# Patient Record
Sex: MALE | Race: WHITE | NOT HISPANIC OR LATINO
[De-identification: names, ages, dates, MRNs, and addresses within clinical notes are randomized per-mention and may not be internally consistent; named-entity substitution may affect disease eponyms.]

---

## 2019-03-04 ENCOUNTER — APPOINTMENT (OUTPATIENT)
Dept: CARDIOLOGY | Facility: CLINIC | Age: 24
End: 2019-03-04
Payer: COMMERCIAL

## 2019-03-04 ENCOUNTER — NON-APPOINTMENT (OUTPATIENT)
Age: 24
End: 2019-03-04

## 2019-03-04 ENCOUNTER — APPOINTMENT (OUTPATIENT)
Dept: CARDIOLOGY | Facility: CLINIC | Age: 24
End: 2019-03-04

## 2019-03-04 ENCOUNTER — TRANSCRIPTION ENCOUNTER (OUTPATIENT)
Age: 24
End: 2019-03-04

## 2019-03-04 VITALS
HEART RATE: 75 BPM | WEIGHT: 184 LBS | OXYGEN SATURATION: 98 % | HEIGHT: 69 IN | SYSTOLIC BLOOD PRESSURE: 118 MMHG | BODY MASS INDEX: 27.25 KG/M2 | DIASTOLIC BLOOD PRESSURE: 70 MMHG

## 2019-03-04 DIAGNOSIS — D22.9 MELANOCYTIC NEVI, UNSPECIFIED: ICD-10-CM

## 2019-03-04 DIAGNOSIS — Z00.00 ENCOUNTER FOR GENERAL ADULT MEDICAL EXAMINATION W/OUT ABNORMAL FINDINGS: ICD-10-CM

## 2019-03-04 DIAGNOSIS — Z11.3 ENCOUNTER FOR SCREENING FOR INFECTIONS WITH A PREDOMINANTLY SEXUAL MODE OF TRANSMISSION: ICD-10-CM

## 2019-03-04 DIAGNOSIS — I45.10 UNSPECIFIED RIGHT BUNDLE-BRANCH BLOCK: ICD-10-CM

## 2019-03-04 LAB
ALBUMIN SERPL ELPH-MCNC: 4.9 G/DL
ALP BLD-CCNC: 56 U/L
ALT SERPL-CCNC: 18 U/L
ANION GAP SERPL CALC-SCNC: 13 MMOL/L
AST SERPL-CCNC: 24 U/L
BASOPHILS # BLD AUTO: 0.03 K/UL
BASOPHILS NFR BLD AUTO: 0.6 %
BILIRUB SERPL-MCNC: 0.4 MG/DL
BUN SERPL-MCNC: 17 MG/DL
CALCIUM SERPL-MCNC: 9.7 MG/DL
CHLORIDE SERPL-SCNC: 102 MMOL/L
CHOLEST SERPL-MCNC: 192 MG/DL
CHOLEST/HDLC SERPL: 3.5 RATIO
CO2 SERPL-SCNC: 27 MMOL/L
CREAT SERPL-MCNC: 1.16 MG/DL
CRP SERPL HS-MCNC: 0.17 MG/L
EOSINOPHIL # BLD AUTO: 0.04 K/UL
EOSINOPHIL NFR BLD AUTO: 0.8 %
ESTRADIOL SERPL-MCNC: 23 PG/ML
GLUCOSE SERPL-MCNC: 89 MG/DL
HBA1C MFR BLD HPLC: 5.2 %
HCT VFR BLD CALC: 48.9 %
HCYS SERPL-MCNC: 5.9 UMOL/L
HDLC SERPL-MCNC: 55 MG/DL
HGB BLD-MCNC: 16 G/DL
IMM GRANULOCYTES NFR BLD AUTO: 0.4 %
LDLC SERPL CALC-MCNC: 127 MG/DL
LYMPHOCYTES # BLD AUTO: 1.45 K/UL
LYMPHOCYTES NFR BLD AUTO: 27.3 %
MAN DIFF?: NORMAL
MCHC RBC-ENTMCNC: 29.6 PG
MCHC RBC-ENTMCNC: 32.7 GM/DL
MCV RBC AUTO: 90.6 FL
MONOCYTES # BLD AUTO: 0.75 K/UL
MONOCYTES NFR BLD AUTO: 14.1 %
NEUTROPHILS # BLD AUTO: 3.03 K/UL
NEUTROPHILS NFR BLD AUTO: 56.8 %
PLATELET # BLD AUTO: 231 K/UL
POTASSIUM SERPL-SCNC: 4.4 MMOL/L
PROLACTIN SERPL-MCNC: 8.9 NG/ML
PROT SERPL-MCNC: 7.5 G/DL
PSA FREE FLD-MCNC: 48 %
PSA FREE SERPL-MCNC: 0.23 NG/ML
PSA SERPL-MCNC: 0.49 NG/ML
RBC # BLD: 5.4 M/UL
RBC # FLD: 12 %
SODIUM SERPL-SCNC: 142 MMOL/L
TRIGL SERPL-MCNC: 48 MG/DL
TSH SERPL-ACNC: 1.3 UIU/ML
WBC # FLD AUTO: 5.32 K/UL

## 2019-03-04 PROCEDURE — 93224 XTRNL ECG REC UP TO 48 HRS: CPT

## 2019-03-04 PROCEDURE — 99385 PREV VISIT NEW AGE 18-39: CPT

## 2019-03-04 PROCEDURE — 93306 TTE W/DOPPLER COMPLETE: CPT

## 2019-03-04 PROCEDURE — 93000 ELECTROCARDIOGRAM COMPLETE: CPT | Mod: 59

## 2019-03-04 RX ORDER — FINASTERIDE 1 MG/1
1 TABLET ORAL
Refills: 0 | Status: ACTIVE | COMMUNITY

## 2019-03-04 NOTE — ASSESSMENT
[FreeTextEntry1] : Pt here for routine physical as well as cardiac clearance prior to initiation of ADD medication. Pt with history of anxiety and remote history of palpitations.

## 2019-03-04 NOTE — HISTORY OF PRESENT ILLNESS
[FreeTextEntry1] : 23 yr old male presents for follow up for history of palpitations and clearance for ADD medication. Also states he is overdue for a physical exam. Denies any complaints at this time. denies chest pain sob /n/v /diaphoresis .pt with hx of palpitations

## 2019-03-04 NOTE — PHYSICAL EXAM
[General Appearance - Well Developed] : well developed [Normal Appearance] : normal appearance [Well Groomed] : well groomed [General Appearance - Well Nourished] : well nourished [No Deformities] : no deformities [General Appearance - In No Acute Distress] : no acute distress [FreeTextEntry1] : few nevi noted

## 2019-03-04 NOTE — DISCUSSION/SUMMARY
[Anxiety] : anxiety disorder NOS [Stable] : stable [None] : There are no changes in medication management [Patient] : the patient [FreeTextEntry1] : Pt seen and examined for palpitation follow up and physical exam. Lab work and echo ordered for ADD medication clearance. Will obtain holter as well. Counseled patient regarding STD education; patient is in monogamous relationship and practices safe sex. Multiple nevi noted-patient encouraged to see dermatologist for screening.

## 2019-03-05 LAB
25(OH)D3 SERPL-MCNC: 23.6 NG/ML
THYROGLOB AB SERPL-ACNC: <20 IU/ML
THYROPEROXIDASE AB SERPL IA-ACNC: <10 IU/ML

## 2019-03-06 ENCOUNTER — NON-APPOINTMENT (OUTPATIENT)
Age: 24
End: 2019-03-06

## 2019-03-08 LAB
DHEA SERPL-MCNC: 375 NG/DL
TESTOST BND SERPL-MCNC: 11.6 PG/ML
TESTOST SERPL-MCNC: 638.2 NG/DL

## 2019-03-11 LAB — T4 FREE SERPL DIALY-MCNC: 1.4 NG/DL

## 2020-05-22 ENCOUNTER — APPOINTMENT (OUTPATIENT)
Dept: PEDIATRIC UROLOGY | Facility: CLINIC | Age: 25
End: 2020-05-22
Payer: COMMERCIAL

## 2020-05-22 VITALS — WEIGHT: 185 LBS | HEIGHT: 69 IN | TEMPERATURE: 98.5 F | BODY MASS INDEX: 27.4 KG/M2

## 2020-05-22 DIAGNOSIS — N50.89 OTHER SPECIFIED DISORDERS OF THE MALE GENITAL ORGANS: ICD-10-CM

## 2020-05-22 PROCEDURE — 99214 OFFICE O/P EST MOD 30 MIN: CPT | Mod: 25

## 2020-05-22 PROCEDURE — 93976 VASCULAR STUDY: CPT

## 2020-05-22 PROCEDURE — 76870 US EXAM SCROTUM: CPT

## 2020-05-22 NOTE — ASSESSMENT
[FreeTextEntry1] : REA  has an epididymal cyst. These are very common findings fortunately benign. I had a discussion with him regarding the nature of epididymal cysts and their benign course. They can grow to be large sizes and occasionally causes discomfort. Surgery is typically not indicated except for those 2 relative indications. \par \par He also has a moderate varicocele.  I discussed the possible implications on fertility and that getting a semen analysis for informative purposes could be ordered.  He was not interested in that at this time.\par All questions were answered.    He will return as needed

## 2020-05-22 NOTE — REASON FOR VISIT
[Initial Consultation] : an initial consultation [Patient] : patient [TextBox_50] : right lump on testis [TextBox_8] : Dr. Gianfranco Kent

## 2020-05-22 NOTE — DATA REVIEWED
[FreeTextEntry1] : EXAMINATION:  US SCROTUM\par DOS: TODAY\par FINDINGS: SMALL RIGHT EPIDIDYMAL CYST AND A LEFT VARICOCELE WITH SYMMETRIC SIZED TESTES.  OTHERWISE UNREMARKABLE SCROTAL CONTENTS; NORMAL TESTES WITH NORMAL FLOW

## 2020-05-22 NOTE — HISTORY OF PRESENT ILLNESS
[TextBox_4] : Gianfranco is her for consultation.  He reports that he recently palpated an abnormality in the right testis.  It was during self-examination and was not related to any injury, infection, inflammation.  He has not had any pain or swelling and the size of the area has not changed.  No issues on the other side except a known asymtomatic variocele.

## 2020-05-22 NOTE — PHYSICAL EXAM
[Well nourished] : well nourished [Well developed] : well developed [Well appearing] : well appearing [Deferred] : deferred [Dysmorphic] : no dysmorphic [Acute distress] : no acute distress [Abnormal shape] : no abnormal shape [Ear anomaly] : no ear anomaly [Nasal discharge] : no nasal discharge [Abnormal nose shape] : no abnormal nose shape [Mouth lesions] : no mouth lesions [Eye discharge] : no eye discharge [Icteric sclera] : no icteric sclera [Labored breathing] : non- labored breathing [Rigid] : not rigid [Hepatomegaly] : no hepatomegaly [Mass] : no mass [Splenomegaly] : no splenomegaly [RUQ Tenderness] : no ruq tenderness [Palpable bladder] : no palpable bladder [LUQ Tenderness] : no luq tenderness [RLQ Tenderness] : no rlq tenderness [LLQ Tenderness] : no llq tenderness [Right tenderness] : no right tenderness [Left tenderness] : no left tenderness [Renomegaly] : no renomegaly [Right-side mass] : no right-side mass [Left-side mass] : no left-side mass [Dimple] : no dimple [Limited limb movement] : no limited limb movement [Hair Tuft] : no hair tuft [Edema] : no edema [Rashes] : no rashes [Ulcers] : no ulcers [Abnormal turgor] : normal turgor [Circumcised] : circumcised [At tip of glans] : meatus at tip of glans [Scrotal] : right testicle - scrotal [Vertical] : left testicle - vertical [Palpable] : palpable [Symmetric] : symmetric [Induration] : no induration [Small] : small right epididyal head mass [Tenderness] : no tenderness [Grade 2] : left - grade 2 [No] : left - not palpable

## 2020-05-22 NOTE — CONSULT LETTER
[Dear  ___] : Dear  [unfilled], [FreeTextEntry1] : Please see my note below.\par \par Thank you so very much for allowing to participate in REA's care.  Please don't hesitate to call me should any questions or issues arise.\par \par Sincerely, \par \par Melchor\par \par Melchor Comer MD\par Chief, Pediatric Urology\par Professor of Urology and Pediatrics\par [facility]  [Consult Letter:] : I had the pleasure of evaluating your patient, [unfilled].

## 2020-07-28 ENCOUNTER — APPOINTMENT (OUTPATIENT)
Dept: CARDIOLOGY | Facility: CLINIC | Age: 25
End: 2020-07-28
Payer: COMMERCIAL

## 2020-07-28 ENCOUNTER — NON-APPOINTMENT (OUTPATIENT)
Age: 25
End: 2020-07-28

## 2020-07-28 ENCOUNTER — LABORATORY RESULT (OUTPATIENT)
Age: 25
End: 2020-07-28

## 2020-07-28 VITALS
HEART RATE: 65 BPM | WEIGHT: 182 LBS | DIASTOLIC BLOOD PRESSURE: 60 MMHG | BODY MASS INDEX: 26.96 KG/M2 | OXYGEN SATURATION: 97 % | HEIGHT: 69 IN | TEMPERATURE: 98.9 F | SYSTOLIC BLOOD PRESSURE: 126 MMHG

## 2020-07-28 DIAGNOSIS — R06.02 SHORTNESS OF BREATH: ICD-10-CM

## 2020-07-28 DIAGNOSIS — Z12.5 ENCOUNTER FOR SCREENING FOR MALIGNANT NEOPLASM OF PROSTATE: ICD-10-CM

## 2020-07-28 DIAGNOSIS — Z13.228 ENCOUNTER FOR SCREENING FOR OTHER METABOLIC DISORDERS: ICD-10-CM

## 2020-07-28 DIAGNOSIS — R00.2 PALPITATIONS: ICD-10-CM

## 2020-07-28 DIAGNOSIS — Z00.00 ENCOUNTER FOR GENERAL ADULT MEDICAL EXAMINATION W/OUT ABNORMAL FINDINGS: ICD-10-CM

## 2020-07-28 DIAGNOSIS — F41.9 ANXIETY DISORDER, UNSPECIFIED: ICD-10-CM

## 2020-07-28 PROCEDURE — 99395 PREV VISIT EST AGE 18-39: CPT

## 2020-07-28 PROCEDURE — 93000 ELECTROCARDIOGRAM COMPLETE: CPT

## 2020-07-28 RX ORDER — BUPROPION HYDROCHLORIDE 300 MG/1
300 TABLET, EXTENDED RELEASE ORAL
Refills: 0 | Status: DISCONTINUED | COMMUNITY
End: 2020-07-28

## 2020-07-28 NOTE — PHYSICAL EXAM
[General Appearance - Well Developed] : well developed [Normal Appearance] : normal appearance [Well Groomed] : well groomed [General Appearance - Well Nourished] : well nourished [No Deformities] : no deformities [General Appearance - In No Acute Distress] : no acute distress [Normal Conjunctiva] : the conjunctiva exhibited no abnormalities [Eyelids - No Xanthelasma] : the eyelids demonstrated no xanthelasmas [Normal Oral Mucosa] : normal oral mucosa [No Oral Pallor] : no oral pallor [No Oral Cyanosis] : no oral cyanosis [Normal Jugular Venous A Waves Present] : normal jugular venous A waves present [Normal Jugular Venous V Waves Present] : normal jugular venous V waves present [No Jugular Venous Styles A Waves] : no jugular venous styles A waves [Respiration, Rhythm And Depth] : normal respiratory rhythm and effort [Auscultation Breath Sounds / Voice Sounds] : lungs were clear to auscultation bilaterally [Exaggerated Use Of Accessory Muscles For Inspiration] : no accessory muscle use [Abdomen Soft] : soft [Abdomen Tenderness] : non-tender [Abdomen Mass (___ Cm)] : no abdominal mass palpated [FreeTextEntry1] : normal genitals  [Abnormal Walk] : normal gait [Gait - Sufficient For Exercise Testing] : the gait was sufficient for exercise testing [Cyanosis, Localized] : no localized cyanosis [Nail Clubbing] : no clubbing of the fingernails [Petechial Hemorrhages (___cm)] : no petechial hemorrhages [Skin Color & Pigmentation] : normal skin color and pigmentation [] : no rash [No Venous Stasis] : no venous stasis [Skin Lesions] : no skin lesions [No Skin Ulcers] : no skin ulcer [No Xanthoma] : no  xanthoma was observed [Oriented To Time, Place, And Person] : oriented to person, place, and time [Affect] : the affect was normal [Mood] : the mood was normal [No Anxiety] : not feeling anxious

## 2020-07-28 NOTE — HISTORY OF PRESENT ILLNESS
[FreeTextEntry1] : pt presents for yearly physical .pt with episodes of palpitations  associated with stress .pt doing well otherwise seeing psychiatry  and being monitored was given adderal pt denies any chest  pain dizziness ,lightheadedness ,nausea vomiting diaphoresis\par pt hardly takes adderal .pt also wwith episodes of sob when sleeping concerned about sleep aopnea no hx of snoring

## 2020-07-30 ENCOUNTER — APPOINTMENT (OUTPATIENT)
Dept: CARDIOLOGY | Facility: CLINIC | Age: 25
End: 2020-07-30
Payer: COMMERCIAL

## 2020-07-30 PROCEDURE — 93015 CV STRESS TEST SUPVJ I&R: CPT

## 2020-07-30 PROCEDURE — 93306 TTE W/DOPPLER COMPLETE: CPT

## 2020-08-10 ENCOUNTER — NON-APPOINTMENT (OUTPATIENT)
Age: 25
End: 2020-08-10

## 2020-08-11 ENCOUNTER — APPOINTMENT (OUTPATIENT)
Dept: CARDIOLOGY | Facility: CLINIC | Age: 25
End: 2020-08-11
Payer: COMMERCIAL

## 2020-08-11 PROCEDURE — 93224 XTRNL ECG REC UP TO 48 HRS: CPT

## 2020-08-23 LAB
25(OH)D3 SERPL-MCNC: 33 NG/ML
ALBUMIN SERPL ELPH-MCNC: 4.8 G/DL
ALP BLD-CCNC: 49 U/L
ALT SERPL-CCNC: 9 U/L
ANION GAP SERPL CALC-SCNC: 13 MMOL/L
APPEARANCE: CLEAR
AST SERPL-CCNC: 19 U/L
BACTERIA: NEGATIVE
BASOPHILS # BLD AUTO: 0.03 K/UL
BASOPHILS NFR BLD AUTO: 0.4 %
BILIRUB SERPL-MCNC: 0.4 MG/DL
BILIRUBIN URINE: NEGATIVE
BLOOD URINE: NEGATIVE
BUN SERPL-MCNC: 17 MG/DL
CALCIUM SERPL-MCNC: 9.5 MG/DL
CHLORIDE SERPL-SCNC: 103 MMOL/L
CHOLEST SERPL-MCNC: 199 MG/DL
CHOLEST/HDLC SERPL: 3.8 RATIO
CO2 SERPL-SCNC: 24 MMOL/L
COLOR: COLORLESS
CREAT SERPL-MCNC: 1.1 MG/DL
EOSINOPHIL # BLD AUTO: 0.04 K/UL
EOSINOPHIL NFR BLD AUTO: 0.5 %
ESTIMATED AVERAGE GLUCOSE: 97 MG/DL
GLUCOSE QUALITATIVE U: NEGATIVE
GLUCOSE SERPL-MCNC: 85 MG/DL
HBA1C MFR BLD HPLC: 5 %
HCT VFR BLD CALC: 46.6 %
HDLC SERPL-MCNC: 53 MG/DL
HGB BLD-MCNC: 14.9 G/DL
HYALINE CASTS: 0 /LPF
IMM GRANULOCYTES NFR BLD AUTO: 0.3 %
KETONES URINE: NEGATIVE
LDLC SERPL CALC-MCNC: 133 MG/DL
LEUKOCYTE ESTERASE URINE: NEGATIVE
LYMPHOCYTES # BLD AUTO: 2.14 K/UL
LYMPHOCYTES NFR BLD AUTO: 28.5 %
MAGNESIUM SERPL-MCNC: 1.9 MG/DL
MAN DIFF?: NORMAL
MCHC RBC-ENTMCNC: 29.6 PG
MCHC RBC-ENTMCNC: 32 GM/DL
MCV RBC AUTO: 92.5 FL
MICROSCOPIC-UA: NORMAL
MONOCYTES # BLD AUTO: 0.86 K/UL
MONOCYTES NFR BLD AUTO: 11.4 %
NEUTROPHILS # BLD AUTO: 4.43 K/UL
NEUTROPHILS NFR BLD AUTO: 58.9 %
NITRITE URINE: NEGATIVE
PH URINE: 6.5
PHOSPHATE SERPL-MCNC: 3.2 MG/DL
PLATELET # BLD AUTO: 198 K/UL
POTASSIUM SERPL-SCNC: 4.3 MMOL/L
PROT SERPL-MCNC: 7 G/DL
PROTEIN URINE: NEGATIVE
PSA SERPL-MCNC: 0.69 NG/ML
RBC # BLD: 5.04 M/UL
RBC # FLD: 13.2 %
RED BLOOD CELLS URINE: 0 /HPF
SODIUM SERPL-SCNC: 140 MMOL/L
SPECIFIC GRAVITY URINE: 1.01
SQUAMOUS EPITHELIAL CELLS: 0 /HPF
T3RU NFR SERPL: 0.9 TBI
T4 FREE SERPL-MCNC: 1.3 NG/DL
T4 SERPL-MCNC: 7.4 UG/DL
TRIGL SERPL-MCNC: 65 MG/DL
TSH SERPL-ACNC: 1.13 UIU/ML
URATE SERPL-MCNC: 4.5 MG/DL
UROBILINOGEN URINE: NORMAL
WBC # FLD AUTO: 7.52 K/UL
WHITE BLOOD CELLS URINE: 0 /HPF

## 2021-01-30 ENCOUNTER — EMERGENCY (EMERGENCY)
Facility: HOSPITAL | Age: 26
LOS: 1 days | Discharge: HOME | End: 2021-01-30
Admitting: EMERGENCY MEDICINE
Payer: COMMERCIAL

## 2021-01-30 VITALS
OXYGEN SATURATION: 100 % | SYSTOLIC BLOOD PRESSURE: 114 MMHG | HEART RATE: 66 BPM | RESPIRATION RATE: 18 BRPM | DIASTOLIC BLOOD PRESSURE: 62 MMHG | TEMPERATURE: 98 F

## 2021-01-30 PROCEDURE — 99283 EMERGENCY DEPT VISIT LOW MDM: CPT | Mod: 25

## 2021-01-30 PROCEDURE — 12001 RPR S/N/AX/GEN/TRNK 2.5CM/<: CPT

## 2021-01-30 NOTE — ED PROVIDER NOTE - CLINICAL SUMMARY MEDICAL DECISION MAKING FREE TEXT BOX
25yM w/no significant pmhx presenting with laceration to right 2nd digit. Pt is well appearing, 1.5cm laceration to right pointer finger on dorsal aspect distal to PIP does not cross joint space, ROM intact, sensation intact. Tetanus up to date. Will suture repair. 25yM w/no significant pmhx presenting with laceration to right 2nd digit. Pt is well appearing, 1.5cm laceration to right index finger on dorsal aspect distal to PIP does not cross joint space, ROM intact, sensation intact. Tetanus up to date. Will suture repair.

## 2021-01-30 NOTE — ED PROVIDER NOTE - PHYSICAL EXAMINATION
FROM of right 2nd digit  sensation intact throughout Skin: 1.5cm laceration to right 2nd digit, dorsal aspect, distal to PIP, does not cross PIP or DIP, no active bleeding, linear and superficial  FROM right hand including 2nd digit, sensation intact throughout affected finger

## 2021-01-30 NOTE — ED PROVIDER NOTE - PATIENT PORTAL LINK FT
You can access the FollowMyHealth Patient Portal offered by [facility] by registering at the following website: [email or web address]. By joining [facility]’s FollowMyHealth portal, you will also be able to view your health information using other applications (apps) compatible with our system.

## 2021-01-30 NOTE — ED PROVIDER NOTE - OBJECTIVE STATEMENT
25yM w/no significant pmhx presenting with laceration to right 2nd digit. Pt states he was using a knife as a screwdriver ans it slipped, cutting his right second digit. Last tetanus shot was 1.5 years ago. Pt denies numbness, decreased ROM of affected finger 25yM w/no significant pmhx presenting with laceration to right 2nd digit. Pt states he was using a knife as a screwdriver (clean and new) and it slipped, cutting his right second digit. Last tetanus shot was 1.5 years ago. No active bleeding. Pt denies numbness, tingling, decreased ROM of affected finger, weakness, fever/chills or any other concerns.

## 2021-01-30 NOTE — ED ADULT TRIAGE NOTE - CHIEF COMPLAINT QUOTE
PT C/O laceration to right hand second digit. Pt states was using a knife, slipped and cut self. Last tetanus shot 1.5 years ago. Clean dry bandage applied.

## 2021-01-30 NOTE — ED PROVIDER NOTE - NSFOLLOWUPINSTRUCTIONS_ED_ALL_ED_FT
Follow up with your primary care doctor in 7 days OR return to the ER in 7 days to have sutures removed  Apply bacitracin to area daily and cover with bandage  Keep area dry for 24 hours then you can wash gently with warm soapy water  Return to the ER with any worsening or concerning symptoms, pain or swelling to area, pus drainage, fever or any other concerns.

## 2021-01-30 NOTE — ED PROVIDER NOTE - PROGRESS NOTE DETAILS
TIRSO Wick: ___ suture repain 7 sutures TIRSO Wick: Placed 7 sutures to repair laceration, no complications. Discussed with pt he is to return in 7 days to have the sutures removed. Keep area dry for 24 hours then he can wash gently with warm soapy water,

## 2023-06-07 PROBLEM — Z78.9 OTHER SPECIFIED HEALTH STATUS: Chronic | Status: ACTIVE | Noted: 2021-01-31

## 2023-06-14 ENCOUNTER — NON-APPOINTMENT (OUTPATIENT)
Age: 28
End: 2023-06-14

## 2023-06-15 ENCOUNTER — NON-APPOINTMENT (OUTPATIENT)
Age: 28
End: 2023-06-15

## 2023-06-15 ENCOUNTER — APPOINTMENT (OUTPATIENT)
Dept: ORTHOPEDIC SURGERY | Facility: CLINIC | Age: 28
End: 2023-06-15
Payer: COMMERCIAL

## 2023-06-15 VITALS
WEIGHT: 175 LBS | HEART RATE: 68 BPM | HEIGHT: 69 IN | BODY MASS INDEX: 25.92 KG/M2 | SYSTOLIC BLOOD PRESSURE: 133 MMHG | DIASTOLIC BLOOD PRESSURE: 80 MMHG

## 2023-06-15 DIAGNOSIS — S76.312D STRAIN OF MUSCLE, FASCIA AND TENDON OF THE POSTERIOR MUSCLE GROUP AT THIGH LEVEL, LEFT THIGH, SUBSEQUENT ENCOUNTER: ICD-10-CM

## 2023-06-15 PROCEDURE — 99203 OFFICE O/P NEW LOW 30 MIN: CPT

## 2023-06-15 PROCEDURE — 73552 X-RAY EXAM OF FEMUR 2/>: CPT | Mod: LT

## 2023-06-30 PROBLEM — S76.312D STRAIN OF LEFT HAMSTRING, SUBSEQUENT ENCOUNTER: Status: ACTIVE | Noted: 2023-06-30

## 2023-06-30 NOTE — ADDENDUM
[FreeTextEntry1] : This note was written by Corie Davidson on 06/15/2023 acting solely as a scribe for Dr. New Maguire.\par \par All medical record entries made by the Scribe were at my, Dr. New Maguire, direction and personally dictated by me on 06/15/2023. I have personally reviewed the chart and agree that the record accurately reflects my personal performance of the history, physical exam, assessment and plan.

## 2023-06-30 NOTE — PHYSICAL EXAM
[de-identified] : Oriented to time, place, person\par Mood: Normal\par Affect: Normal\par Appearance: Healthy, well appearing, no acute distress.\par Gait: Normal\par Assistive Devices: None\par \par Left Lower Extremity:\par \par Skin: Clean, dry, intact\par Inspection: No obvious malalignment, no masses, no swelling.\par Pulses: 2+ DP/PT pulses\par ROM: Full knee ROM, full ankle ROM\par Tenderness: Discomfort at the myotendinous junction medial thigh. \par Stability: Ankle A/P stable, Knee stable varus/valgus/drawer\par Strength: 5/5 Q/TA/GS/EHL, 4/5 H without atrophy\par Neuro: In tact to light touch throughout, DTR's normal [de-identified] : The following radiographs were ordered and read by me during this patients visit. I reviewed each radiograph in detail with the patient and discussed the findings as highlighted below. \par \par 3 views of the left femur were obtained today, 06/15/2023, that show no acute fracture or dislocation. There is no degenerative change seen. There is no gross malalignment. No significant other obvious osseous abnormality, otherwise unremarkable.

## 2023-06-30 NOTE — HISTORY OF PRESENT ILLNESS
[de-identified] : 28 year old male presents today with left hamstring injury x 5 weeks. He felt a pop running to first base in softball. He noticed bruising a few days later. Reports pain with sprinting and sudden movements. Takes Ibuprofen as needed. C/o weakness. S/p left tibia ORIF 10 years ago. \par \par The patient's past medical history, past surgical history, medications and allergies were reviewed by me today with the patient and documented accordingly. In addition, the patient's family and social history, which were noncontributory to this visit, were reviewed also.

## 2023-06-30 NOTE — DISCUSSION/SUMMARY
[de-identified] : 27 y/o male with left hamstring strain.\par \par The patient presents with an acute injury to the left hamstring. This is consistent with a musculotendinous strain. I discussed that the mechanism typically results from hip flexion and knee extension. On clinical examination the patient does not have posterior ecchymosis or a palpable mass/defect within the thigh, which is consistent with a mild myotendinous injury.\par \par The recommendation for acute hamstring injuries is for flexibility/strengthening rehabilitation with physical therapy guidance. Operating indications are only for proximal avulsion ruptures. The biggest concern for this injury is for recurrence following inadequate rehabilitation. Return to play as typically recommended when strength is 90% of the contralateral side.\par \par Recommendation; rest from impact activities, applying ice packs twice daily, wrapping the injured area with a compression bandage, NSAIDS versus Tylenol to reduce pain and inflammation. Physical therapy as prescribed.\par \par Followup in 4-6 weeks for return to play guidance and re-evaluation.

## 2024-04-02 ENCOUNTER — APPOINTMENT (OUTPATIENT)
Dept: ORTHOPEDIC SURGERY | Facility: CLINIC | Age: 29
End: 2024-04-02
Payer: COMMERCIAL

## 2024-04-02 VITALS — WEIGHT: 165 LBS | HEIGHT: 69 IN | BODY MASS INDEX: 24.44 KG/M2

## 2024-04-02 DIAGNOSIS — M25.551 PAIN IN RIGHT HIP: ICD-10-CM

## 2024-04-02 PROCEDURE — 73502 X-RAY EXAM HIP UNI 2-3 VIEWS: CPT | Mod: RT

## 2024-04-02 PROCEDURE — 99213 OFFICE O/P EST LOW 20 MIN: CPT

## 2024-04-09 PROBLEM — M25.551 HIP PAIN, RIGHT: Status: ACTIVE | Noted: 2024-04-09

## 2024-04-09 NOTE — HISTORY OF PRESENT ILLNESS
[de-identified] : 28 year old male presents today with right groin pain since November 2023. Patient was playing baseball and felt a twinge while running the bases. The pain is constant worse with sneezing and running. Applies tiger bomb without relief. Aleve is helpful. Patient is very active and unable to do activities due to pain. Patient has sedentary job.

## 2024-04-09 NOTE — DISCUSSION/SUMMARY
[de-identified] : 29 y/o male with right groin pain.   Patient presents with an overuse injury with anterior pelvic and groin pain consistent with "sports hernia/athletic pubalgia". I discussed the pathophysiology of this condition that is typically caused by acute versus microtrauma to the insertion of the hip adductors, rectus, transversalis fascia within the groin. Pain theorized to be caused by emphasis on lower extremity strengthening with minimal emphasis on core leading to balance.  Diagnosis is typically clinical, however MRI imaging can be obtained following failure of conservative treatment. Early treatment is recommended with physical therapy with surgical treat typically reserved for cases refractory to nonoperative management. Surgical treatment is indicated for the specific site/cause of the dysfunction; this may include hernia repair vs. adductor release vs. nerve decompression.  Recommendations: MRI right hip rule out athletic pubalgia. Conservative care & observation, this includes rest/activity avoidance. Patient may also use OTC NSAID's or acetaminophen as tolerated, with application of ice to the area 2-3x daily for 20 minutes after periods of activity.   Follow-up after MRI.

## 2024-04-09 NOTE — ADDENDUM
[FreeTextEntry1] : This note was written by Corie Davidson on 04/02/2024 acting solely as a scribe for Dr. New Maguire.  All medical record entries made by the Scribe were at my, Dr. New Maguire, direction and personally dictated by me on 04/02/2024. I have personally reviewed the chart and agree that the record accurately reflects my personal performance of the history, physical exam, assessment and plan.

## 2024-04-09 NOTE — PHYSICAL EXAM
[de-identified] : Right Hip Exam:   Skin: Clean, dry, intact Inspection: No obvious deformity, no swelling. Pulses: 2+ DP/PT pulses ROM: 120 flexion. Internal rotation to 30. External rotation to 45. Painful ROM: Positive groin pain with activation of core. Tenderness: No tenderness over greater trochanter. No tenderness at gluteal insertion. No paraspinal tenderness. No axial lumbar tenderness. No sacroiliac tenderness. No TTP over the ASIS/Iliac crest.  Tender over the abductor.  Tender over the inguinal canal. Strength: 4+/5 hip flexion, 5/5 gluteal strength, 5/5 hip ADD, 5/5 hip ABD, 5/5 Q/H, 5/5 TA/GS/EHL Neuro: Sensation intact to light touch throughout, DTR normal Additional tests: Negative impingement test, Negative MARIANA Other findings: None.  [de-identified] : The following radiographs were ordered and read by me during this patients visit. I reviewed each radiograph in detail with the patient and discussed the findings as highlighted below.   AP pelvis and lateral view of the right hip were obtained today, 04/02/2024, that show no acute bony injury, including fracture or dislocation. There is no hip degenerative change seen. There is no gross pelvic of hip malalignment. No significant other obvious osseous abnormality, otherwise unremarkable.